# Patient Record
Sex: FEMALE | Race: WHITE | NOT HISPANIC OR LATINO | Employment: FULL TIME | URBAN - NONMETROPOLITAN AREA
[De-identification: names, ages, dates, MRNs, and addresses within clinical notes are randomized per-mention and may not be internally consistent; named-entity substitution may affect disease eponyms.]

---

## 2021-06-05 ENCOUNTER — HOSPITAL ENCOUNTER (EMERGENCY)
Facility: HOSPITAL | Age: 46
Discharge: HOME | End: 2021-06-05
Attending: EMERGENCY MEDICINE | Admitting: EMERGENCY MEDICINE
Payer: COMMERCIAL

## 2021-06-05 VITALS
DIASTOLIC BLOOD PRESSURE: 68 MMHG | WEIGHT: 168.21 LBS | OXYGEN SATURATION: 100 % | SYSTOLIC BLOOD PRESSURE: 105 MMHG | TEMPERATURE: 97.6 F | HEART RATE: 63 BPM | HEIGHT: 68 IN | RESPIRATION RATE: 18 BRPM | BODY MASS INDEX: 25.49 KG/M2

## 2021-06-05 DIAGNOSIS — R11.2 NAUSEA AND VOMITING: Primary | ICD-10-CM

## 2021-06-05 LAB
ALBUMIN SERPL BCP-MCNC: 4 G/DL (ref 3.5–5)
ALP SERPL-CCNC: 78 U/L (ref 46–116)
ALT SERPL W P-5'-P-CCNC: 28 U/L (ref 12–78)
ANION GAP SERPL CALCULATED.3IONS-SCNC: 4 MMOL/L (ref 4–13)
AST SERPL W P-5'-P-CCNC: 20 U/L (ref 5–45)
BASOPHILS # BLD AUTO: 0.06 THOUSANDS/ΜL (ref 0–0.1)
BASOPHILS NFR BLD AUTO: 0 % (ref 0–1)
BILIRUB SERPL-MCNC: 0.53 MG/DL (ref 0.2–1)
BUN SERPL-MCNC: 18 MG/DL (ref 5–25)
CALCIUM SERPL-MCNC: 9.1 MG/DL (ref 8.3–10.1)
CHLORIDE SERPL-SCNC: 97 MMOL/L (ref 100–108)
CO2 SERPL-SCNC: 30 MMOL/L (ref 21–32)
CREAT SERPL-MCNC: 0.89 MG/DL (ref 0.6–1.3)
EOSINOPHIL # BLD AUTO: 0.08 THOUSAND/ΜL (ref 0–0.61)
EOSINOPHIL NFR BLD AUTO: 1 % (ref 0–6)
ERYTHROCYTE [DISTWIDTH] IN BLOOD BY AUTOMATED COUNT: 12.1 % (ref 11.6–15.1)
GFR SERPL CREATININE-BSD FRML MDRD: 78 ML/MIN/1.73SQ M
GLUCOSE SERPL-MCNC: 122 MG/DL (ref 65–140)
HCT VFR BLD AUTO: 43.6 % (ref 34.8–46.1)
HGB BLD-MCNC: 15.1 G/DL (ref 11.5–15.4)
IMM GRANULOCYTES # BLD AUTO: 0.04 THOUSAND/UL (ref 0–0.2)
IMM GRANULOCYTES NFR BLD AUTO: 0 % (ref 0–2)
LYMPHOCYTES # BLD AUTO: 1.38 THOUSANDS/ΜL (ref 0.6–4.47)
LYMPHOCYTES NFR BLD AUTO: 10 % (ref 14–44)
MAGNESIUM SERPL-MCNC: 1.8 MG/DL (ref 1.6–2.6)
MCH RBC QN AUTO: 33.4 PG (ref 26.8–34.3)
MCHC RBC AUTO-ENTMCNC: 34.6 G/DL (ref 31.4–37.4)
MCV RBC AUTO: 97 FL (ref 82–98)
MONOCYTES # BLD AUTO: 1.13 THOUSAND/ΜL (ref 0.17–1.22)
MONOCYTES NFR BLD AUTO: 8 % (ref 4–12)
NEUTROPHILS # BLD AUTO: 10.96 THOUSANDS/ΜL (ref 1.85–7.62)
NEUTS SEG NFR BLD AUTO: 81 % (ref 43–75)
NRBC BLD AUTO-RTO: 0 /100 WBCS
PLATELET # BLD AUTO: 228 THOUSANDS/UL (ref 149–390)
PMV BLD AUTO: 10.7 FL (ref 8.9–12.7)
POTASSIUM SERPL-SCNC: 3.4 MMOL/L (ref 3.5–5.3)
PROT SERPL-MCNC: 7.3 G/DL (ref 6.4–8.2)
RBC # BLD AUTO: 4.52 MILLION/UL (ref 3.81–5.12)
SODIUM SERPL-SCNC: 131 MMOL/L (ref 136–145)
TROPONIN I SERPL-MCNC: <0.02 NG/ML
WBC # BLD AUTO: 13.65 THOUSAND/UL (ref 4.31–10.16)

## 2021-06-05 PROCEDURE — 84484 ASSAY OF TROPONIN QUANT: CPT | Performed by: PHYSICIAN ASSISTANT

## 2021-06-05 PROCEDURE — 36415 COLL VENOUS BLD VENIPUNCTURE: CPT | Performed by: PHYSICIAN ASSISTANT

## 2021-06-05 PROCEDURE — 99284 EMERGENCY DEPT VISIT MOD MDM: CPT

## 2021-06-05 PROCEDURE — 99285 EMERGENCY DEPT VISIT HI MDM: CPT | Performed by: PHYSICIAN ASSISTANT

## 2021-06-05 PROCEDURE — 96361 HYDRATE IV INFUSION ADD-ON: CPT

## 2021-06-05 PROCEDURE — 96374 THER/PROPH/DIAG INJ IV PUSH: CPT

## 2021-06-05 PROCEDURE — 85025 COMPLETE CBC W/AUTO DIFF WBC: CPT | Performed by: PHYSICIAN ASSISTANT

## 2021-06-05 PROCEDURE — 80053 COMPREHEN METABOLIC PANEL: CPT | Performed by: PHYSICIAN ASSISTANT

## 2021-06-05 PROCEDURE — 83735 ASSAY OF MAGNESIUM: CPT | Performed by: PHYSICIAN ASSISTANT

## 2021-06-05 PROCEDURE — 93005 ELECTROCARDIOGRAM TRACING: CPT

## 2021-06-05 RX ORDER — ONDANSETRON 2 MG/ML
4 INJECTION INTRAMUSCULAR; INTRAVENOUS ONCE
Status: COMPLETED | OUTPATIENT
Start: 2021-06-05 | End: 2021-06-05

## 2021-06-05 RX ORDER — ONDANSETRON 4 MG/1
4 TABLET, FILM COATED ORAL EVERY 6 HOURS
Qty: 12 TABLET | Refills: 0 | Status: SHIPPED | OUTPATIENT
Start: 2021-06-05

## 2021-06-05 RX ADMIN — SODIUM CHLORIDE 2000 ML: 0.9 INJECTION, SOLUTION INTRAVENOUS at 18:07

## 2021-06-05 RX ADMIN — ONDANSETRON 4 MG: 2 INJECTION INTRAMUSCULAR; INTRAVENOUS at 18:07

## 2021-06-05 RX ADMIN — ONDANSETRON 4 MG: 2 INJECTION INTRAMUSCULAR; INTRAVENOUS at 18:28

## 2021-06-05 NOTE — ED PROVIDER NOTES
History  Chief Complaint   Patient presents with    Vomiting     pt was outside all morning cutting trees with friend and became nauseated, lightheadness, headache and started vomiting around 2hrs ago  The patient is a 54year old female, who presents with lightheadedness, nausea,vomiting  She states that she has been outside since 9 am today helping a friend remove trees  It is humid and 90 degrees outside, and she was attempting to stay hydrated by drinking water  She states that PTA she felt lightheaded and starting to have N/V  She has a mild headache  She denies any falls traumas, blurred vision, dizziness, abdominal pain, chest pain shortness of breaths  Vomiting  Severity:  Moderate  Duration:  1 hour  Timing:  Constant  Number of daily episodes:  5  Quality:  Undigested food and stomach contents  Progression:  Unchanged  Chronicity:  New  Context: not post-tussive and not self-induced    Relieved by:  None tried  Worsened by:  Nothing  Ineffective treatments:  None tried  Associated symptoms: no abdominal pain, no arthralgias, no chills, no cough, no fever and no sore throat    Risk factors: no diabetes, not pregnant and no sick contacts        None       History reviewed  No pertinent past medical history  History reviewed  No pertinent surgical history  History reviewed  No pertinent family history  I have reviewed and agree with the history as documented  E-Cigarette/Vaping    E-Cigarette Use Never User      E-Cigarette/Vaping Substances     Social History     Tobacco Use    Smoking status: Never Smoker    Smokeless tobacco: Never Used   Substance Use Topics    Alcohol use: Yes    Drug use: Never       Review of Systems   Constitutional: Negative for chills and fever  HENT: Negative for ear pain and sore throat  Eyes: Negative for pain and visual disturbance  Respiratory: Negative for cough and shortness of breath      Cardiovascular: Negative for chest pain and palpitations  Gastrointestinal: Positive for vomiting  Negative for abdominal pain  Genitourinary: Negative for dysuria and hematuria  Musculoskeletal: Negative for arthralgias and back pain  Skin: Negative for color change and rash  Neurological: Negative for seizures and syncope  All other systems reviewed and are negative  Physical Exam  Physical Exam  Vitals signs and nursing note reviewed  Constitutional:       General: She is not in acute distress  Appearance: She is well-developed  Comments: Actively vomiting   HENT:      Head: Normocephalic and atraumatic  Mouth/Throat:      Mouth: Mucous membranes are dry  Eyes:      Extraocular Movements: Extraocular movements intact  Conjunctiva/sclera: Conjunctivae normal       Pupils: Pupils are equal, round, and reactive to light  Neck:      Musculoskeletal: Neck supple  Cardiovascular:      Rate and Rhythm: Normal rate and regular rhythm  Heart sounds: No murmur  Pulmonary:      Effort: Pulmonary effort is normal  No respiratory distress  Breath sounds: Normal breath sounds  Abdominal:      Palpations: Abdomen is soft  Tenderness: There is no abdominal tenderness  There is no right CVA tenderness or left CVA tenderness  Skin:     General: Skin is warm and dry  Capillary Refill: Capillary refill takes less than 2 seconds  Neurological:      General: No focal deficit present  Mental Status: She is alert and oriented to person, place, and time           Vital Signs  ED Triage Vitals [06/05/21 1757]   Temperature Pulse Respirations Blood Pressure SpO2   98 7 °F (37 1 °C) 76 17 124/92 100 %      Temp Source Heart Rate Source Patient Position - Orthostatic VS BP Location FiO2 (%)   Temporal Monitor Lying Right arm --      Pain Score       5           Vitals:    06/05/21 1757 06/05/21 1815 06/05/21 1830 06/05/21 1845   BP: 124/92 127/77 118/70    Pulse: 76 59 (!) 53 59   Patient Position - Orthostatic VS: Lying            Visual Acuity      ED Medications  Medications   sodium chloride 0 9 % bolus 2,000 mL (2,000 mL Intravenous New Bag 6/5/21 1807)   ondansetron (ZOFRAN) injection 4 mg (4 mg Intravenous Given 6/5/21 1807)   ondansetron (ZOFRAN) injection 4 mg (4 mg Intravenous Given 6/5/21 1828)       Diagnostic Studies  Results Reviewed     Procedure Component Value Units Date/Time    Troponin I [[id number]]  (Normal) Collected: 06/05/21 1806    Lab Status: Final result Specimen: Blood from Arm, Left Updated: 06/05/21 1839     Troponin I <0 02 ng/mL     Comprehensive metabolic panel [[id number]]  (Abnormal) Collected: 06/05/21 1806    Lab Status: Final result Specimen: Blood from Arm, Left Updated: 06/05/21 1833     Sodium 131 mmol/L      Potassium 3 4 mmol/L      Chloride 97 mmol/L      CO2 30 mmol/L      ANION GAP 4 mmol/L      BUN 18 mg/dL      Creatinine 0 89 mg/dL      Glucose 122 mg/dL      Calcium 9 1 mg/dL      AST 20 U/L      ALT 28 U/L      Alkaline Phosphatase 78 U/L      Total Protein 7 3 g/dL      Albumin 4 0 g/dL      Total Bilirubin 0 53 mg/dL      eGFR 78 ml/min/1 73sq m     Narrative:      [place] guidelines for Chronic Kidney Disease (CKD):     Stage 1 with normal or high GFR (GFR > 90 mL/min/1 73 square meters)    Stage 2 Mild CKD (GFR = 60-89 mL/min/1 73 square meters)    Stage 3A Moderate CKD (GFR = 45-59 mL/min/1 73 square meters)    Stage 3B Moderate CKD (GFR = 30-44 mL/min/1 73 square meters)    Stage 4 Severe CKD (GFR = 15-29 mL/min/1 73 square meters)    Stage 5 End Stage CKD (GFR <15 mL/min/1 73 square meters)  Note: GFR calculation is accurate only with a steady state creatinine    Magnesium [[id number]]  (Normal) Collected: 06/05/21 1806    Lab Status: Final result Specimen: Blood from Arm, Left Updated: 06/05/21 1833     Magnesium 1 8 mg/dL     CBC and differential [[id number]]  (Abnormal) Collected: 06/05/21 1806    Lab Status: Final result Specimen: Blood from Arm, Left Updated: 06/05/21 1818     WBC 13 65 Thousand/uL      RBC 4 52 Million/uL      Hemoglobin 15 1 g/dL      Hematocrit 43 6 %      MCV 97 fL      MCH 33 4 pg      MCHC 34 6 g/dL      RDW 12 1 %      MPV 10 7 fL      Platelets 712 Thousands/uL      nRBC 0 /100 WBCs      Neutrophils Relative 81 %      Immat GRANS % 0 %      Lymphocytes Relative 10 %      Monocytes Relative 8 %      Eosinophils Relative 1 %      Basophils Relative 0 %      Neutrophils Absolute 10 96 Thousands/µL      Immature Grans Absolute 0 04 Thousand/uL      Lymphocytes Absolute 1 38 Thousands/µL      Monocytes Absolute 1 13 Thousand/µL      Eosinophils Absolute 0 08 Thousand/µL      Basophils Absolute 0 06 Thousands/µL                  No orders to display              Procedures  ECG 12 Lead Documentation Only    Date/Time: 6/5/2021 6:32 PM  Performed by: Gema Sanches PA-C  Authorized by: Gmea Sanches PA-C     Indications / Diagnosis:  Nausea,vomiting  ECG reviewed by me, the ED Provider: yes    Patient location:  ED  Interpretation:     Interpretation: non-specific    Rate:     ECG rate:  65    ECG rate assessment: normal    Rhythm:     Rhythm: sinus rhythm               ED Course  ED Course as of Jun 05 1910   Sat Jun 05, 2021   1844 Patient feeling slightly improved will give IVF and monitor                                 SBIRT 20yo+      Most Recent Value   SBIRT (25 yo +)   In order to provide better care to our patients, we are screening all of our patients for alcohol and drug use  Would it be okay to ask you these screening questions?   No Filed at: 06/05/2021 1807                    MDM  Number of Diagnoses or Management Options     Amount and/or Complexity of Data Reviewed  Clinical lab tests: ordered and reviewed  Decide to obtain previous medical records or to obtain history from someone other than the patient: yes  Review and summarize past medical records: yes  Independent visualization of images, tracings, or specimens: yes    Risk of Complications, Morbidity, and/or Mortality  Presenting problems: moderate  Diagnostic procedures: moderate  Management options: moderate        Disposition  Final diagnoses:   Nausea and vomiting     Time reflects when diagnosis was documented in both MDM as applicable and the Disposition within this note     Time User Action Codes Description Comment    6/5/2021  6:49 PM Pablo Marshall Add [R11 2] Nausea and vomiting       ED Disposition     ED Disposition Condition Date/Time Comment    Discharge Stable Sat Jun 5, 2021  6:49 PM Lucina Teresa discharge to home/self care  Follow-up Information    None         Patient's Medications   Discharge Prescriptions    ONDANSETRON (ZOFRAN) 4 MG TABLET    Take 1 tablet (4 mg total) by mouth every 6 (six) hours       Start Date: 6/5/2021  End Date: --       Order Dose: 4 mg       Quantity: 12 tablet    Refills: 0     No discharge procedures on file      PDMP Review     None          ED Provider  Electronically Signed by           Tulio Palmer PA-C  06/05/21 7584

## 2021-06-05 NOTE — Clinical Note
Rupa Cody was seen and treated in our emergency department on 6/5/2021  Diagnosis:     Yaya Osman  is off the rest of the shift today, may return to work on return date  She may return on this date: 06/08/2021         If you have any questions or concerns, please don't hesitate to call        Hussein Shi PA-C    ______________________________           _______________          _______________  Hospital Representative                              Date                                Time

## 2021-06-07 LAB
ATRIAL RATE: 64 BPM
ATRIAL RATE: 65 BPM
P AXIS: 57 DEGREES
P AXIS: 57 DEGREES
PR INTERVAL: 156 MS
PR INTERVAL: 156 MS
QRS AXIS: 50 DEGREES
QRS AXIS: 51 DEGREES
QRSD INTERVAL: 102 MS
QRSD INTERVAL: 102 MS
QT INTERVAL: 462 MS
QT INTERVAL: 464 MS
QTC INTERVAL: 478 MS
QTC INTERVAL: 480 MS
T WAVE AXIS: 44 DEGREES
T WAVE AXIS: 46 DEGREES
VENTRICULAR RATE: 64 BPM
VENTRICULAR RATE: 65 BPM